# Patient Record
Sex: MALE | Race: AMERICAN INDIAN OR ALASKA NATIVE
[De-identification: names, ages, dates, MRNs, and addresses within clinical notes are randomized per-mention and may not be internally consistent; named-entity substitution may affect disease eponyms.]

---

## 2017-10-19 ENCOUNTER — HOSPITAL ENCOUNTER (OUTPATIENT)
Dept: HOSPITAL 5 - GIO | Age: 62
Discharge: HOME | End: 2017-10-19
Attending: INTERNAL MEDICINE
Payer: COMMERCIAL

## 2017-10-19 VITALS — SYSTOLIC BLOOD PRESSURE: 113 MMHG | DIASTOLIC BLOOD PRESSURE: 68 MMHG

## 2017-10-19 DIAGNOSIS — K64.9: ICD-10-CM

## 2017-10-19 DIAGNOSIS — Z87.891: ICD-10-CM

## 2017-10-19 DIAGNOSIS — K64.8: ICD-10-CM

## 2017-10-19 DIAGNOSIS — K57.30: ICD-10-CM

## 2017-10-19 DIAGNOSIS — D12.3: Primary | ICD-10-CM

## 2017-10-19 DIAGNOSIS — Z91.013: ICD-10-CM

## 2017-10-19 PROCEDURE — 88305 TISSUE EXAM BY PATHOLOGIST: CPT

## 2017-10-19 PROCEDURE — 45384 COLONOSCOPY W/LESION REMOVAL: CPT

## 2017-10-19 NOTE — DISCHARGE SUMMARY
Short Stay Discharge Plan


Activity: advance as tolerated


Weight Bearing Status: Weight Bear as Tolerated


Diet: regular


Follow up with: 


MICHELET RIBEIRO MD [Primary Care Provider] - 7 Days

## 2017-10-19 NOTE — OPERATIVE REPORT
Operative Report


Operative Report: 


Date of procedure: 10/19/2017





Procedure: Colonoscopy with multiple hot biopsy polypectomies.





Attending physician: Alejandro Knox MD





: Alejandro Knox MD





Indication: Patient is a 62-year-old male who presents for colorectal cancer 

screening.  Colonoscopy is done to evaluate patient so that treatment may be 

directed based on the findings.





Consent: Informed consent was obtained after advising the patient and family 

regarding nature of this procedure, its indications, potential benefits as well 

as possible complications including but not limited to bleeding perforation and 

adverse reaction to medication, infection as well as other cardiopulmonary 

complications.  An informed written and verbal consent was then obtained after 

due opportunity was provided for questions and answers.





Monitoring: Patient was monitored continuously with pulse oximetry and 

electrocardiographic recordings as well as blood pressure recordings.  Vital 

signs remained stable throughout this procedure with no untoward events.





Preoperative assessment: Patient was assessed immediately prior to this 

procedure for capacity to tolerate monitored anesthesia care and moderate 

sedation as well as general anesthesia.  Patient's ASA classification is 2, 

Mallampati class is 2, Hyomental distance is 3.





Instrument: WearYouWant video colonoscope.





Medications: Propofol, given intravenously in divided doses.  For details 

please refer to anesthesia records.





Description of procedure: Patient was placed in the left lateral decubitus 

position after achieving sedation, a digital rectal examination was performed 

following which the colonoscope was introduced into the anal verge and advanced 

to the cecum which was identified by the cecal valve, the appendiceal orifice, 

as well as by the cecal strap and direct transillumination.  The colonoscope 

was subsequently withdrawn with careful inspection of all mucosal surfaces.  

Patient tolerated this procedure well and was subsequently taken to the 

recovery room.  The following findings were noted.





Findings: Patient had 2 transverse colon polyps one was 4 mm and another one 

was 8 mm.  Both polyps were sessile.  They were both removed by hot biopsy 

polypectomy and retrieved.  Patient had a few scattered diverticula in the 

sigmoid colon.  The rest of the colon was normal.  On retroflex view at the 

anal verge, patient had internal hemorrhoids.





Impression: Multiple transverse colon polyps status post hot biopsy polypectomy 

and some mild diverticulosis


Internal hemorrhoids.





Plan: Follow pathology report


Repeat colonoscopy in 5 years if polyps are adenomatous.


High-fiber diet.

## 2017-10-19 NOTE — ANESTHESIA CONSULTATION
Anesthesia Consult and Med Hx


Date of service: 10/19/17





- Airway


Anesthetic Teeth Evaluation: Good


ROM Head & Neck: Adequate


Mental/Hyoid Distance: Adequate


Mallampati Class: Class II


Intubation Access Assessment: Probably Good





- Pulmonary Exam


CTA: Yes





- Cardiac Exam


Cardiac Exam: RRR





- Pre-Operative Health Status


ASA Pre-Surgery Classification: ASA1


Proposed Anesthetic Plan: MAC





- Pulmonary


Hx Smoking: Yes (QUIT OVER 30 YRS)





- Cardiovascular System


Hx Hypertension: No





- Central Nervous System


Hx Neuromuscular Disorder: No


Hx Psychiatric Problems: No





- Gastrointestinal


Hx Gastroesophageal Reflux Disease: No





- Endocrine


Hx Renal Disease: No


Hx Insulin Dependent Diabetes: No





- Other Systems


Hx Alcohol Use: No


Hx Substance Use: No


Hx Cancer: No


Hx Obesity: No

## 2020-01-23 ENCOUNTER — HOSPITAL ENCOUNTER (EMERGENCY)
Dept: HOSPITAL 5 - ED | Age: 65
Discharge: HOME | End: 2020-01-23
Payer: COMMERCIAL

## 2020-01-23 VITALS — DIASTOLIC BLOOD PRESSURE: 86 MMHG | SYSTOLIC BLOOD PRESSURE: 135 MMHG

## 2020-01-23 DIAGNOSIS — M19.90: ICD-10-CM

## 2020-01-23 DIAGNOSIS — Z79.899: ICD-10-CM

## 2020-01-23 DIAGNOSIS — Z91.013: ICD-10-CM

## 2020-01-23 DIAGNOSIS — K64.9: Primary | ICD-10-CM

## 2020-01-23 LAB
ALBUMIN SERPL-MCNC: 4.3 G/DL (ref 3.9–5)
ALT SERPL-CCNC: 31 UNITS/L (ref 7–56)
BUN SERPL-MCNC: 12 MG/DL (ref 9–20)
BUN/CREAT SERPL: 15 %
CALCIUM SERPL-MCNC: 9.7 MG/DL (ref 8.4–10.2)
HCT VFR BLD CALC: 43.8 % (ref 35.5–45.6)
HEMOLYSIS INDEX: 4
HGB BLD-MCNC: 14.9 GM/DL (ref 11.8–15.2)
MCHC RBC AUTO-ENTMCNC: 34 % (ref 32–34)
MCV RBC AUTO: 84 FL (ref 84–94)
PLATELET # BLD: 238 K/MM3 (ref 140–440)
RBC # BLD AUTO: 5.24 M/MM3 (ref 3.65–5.03)

## 2020-01-23 PROCEDURE — 36415 COLL VENOUS BLD VENIPUNCTURE: CPT

## 2020-01-23 PROCEDURE — 80053 COMPREHEN METABOLIC PANEL: CPT

## 2020-01-23 PROCEDURE — 85027 COMPLETE CBC AUTOMATED: CPT

## 2020-01-23 PROCEDURE — 74022 RADEX COMPL AQT ABD SERIES: CPT

## 2020-01-23 NOTE — EVENT NOTE
ED Screening Note


ED Screening Note: 





63 yo male with rectal pain- states he is having problems having bm due to it





no hx of same





denies bleeding





This initial assessment/diagnostic orders/clinical plan/treatment(s) is/are 

subject to change based on patients health status, clinical progression and re-

assessment by fellow clinical providers in the ED. Further treatment and workup 

at subsequent clinical providers discretion. Patient/guardian urged not to elope

from the ED as their condition may be serious if not clinically assessed and man

aged. 





Initial orders include: 


needs exam

## 2020-01-23 NOTE — XRAY REPORT
PROCEDURE: ABDOMEN FLAT AND UPRIGHT WITH SINGLE VIEW CHEST



HISTORY: abd pain



COMPARISON: None. 



TECHNIQUE: Supine and upright abdominal as well as single view chest radiographs obtained.



FINDINGS:  

Lungs: Mild streaky left basal lung opacities. Lungs are otherwise clear.

Pleural Effusion: No evidence of a pleural effusion is seen.

Pneumothorax:  No evidence of pneumothorax is seen.

Cardiac: The heart size is normal.  

Mediastinal silhouette: The mediastinal silhouette is normal.

Hilar regions: The hilar regions are normal in appearance.

Pulmonary vascularity: The pulmonary vascularity is normal in appearance.

Trachea: The trachea is midline.

Skeletal structures: The skeletal structures are normal in appearance.



Support hardware: None.



Additional findings: Surgical clips in the left chest wall.



Bowel: The bowel gas pattern is normal in appearance. No evidence of obstruction is seen. No evidence
 of free air is identified.

Calcifications: No abnormal calcifications over the kidneys or along the course of the ureters are se
en.  No phleboliths in the pelvis are seen. Prostatic calcifications.

Osseous Structures: The skeletal structures are unremarkable in appearance.    



Additional findings: None.



IMPRESSION:  

1. Left lower lobe subsegmental atelectasis versus scar.

2. Negative abdomen.



Signer Name: Robinson Vance MD 

Signed: 1/23/2020 3:51 PM

 Workstation Name: [id number]

## 2020-01-23 NOTE — EMERGENCY DEPARTMENT REPORT
ED General Adult HPI





- General


Chief complaint: Rectal Pain


Stated complaint: hemrriods


Time Seen by Provider: 01/23/20 13:47


Source: patient


Mode of arrival: Ambulatory


Limitations: No Limitations





- History of Present Illness


Initial comments: 





This is a 64-year-old male nontoxic, well nourished in appearance, no acute 

signs of distress presents to the ED with c/o of rectal pain.  Patient stated 

that rectal pain causes him to unable to have a bowel movement due to the pain. 

Patient denies any rectal bleeding.  Patient denies any abdominal pain, chest 

pain, shortness of breath, headache, stiff neck, numbness, tingling, flank pain 

or urinary symptoms.  Patient denies any allergies.


MD Complaint: hemorrhoids


-: days(s)


Radiation: non-radiation


Severity scale (0 -10): 3


Quality: aching


Consistency: constant


Improves with: none


Worsens with: none


Associated Symptoms: denies other symptoms.  denies: confusion, chest pain, 

cough, diaphoresis, fever/chills, headaches, malaise, nausea/vomiting, rash, 

seizure, shortness of breath, syncope, weakness


Treatments Prior to Arrival: none





- Related Data


                                Home Medications











 Medication  Instructions  Recorded  Confirmed  Last Taken


 


Tramadol HCl [traMADol  MG] 100 mg PO PRN PRN 10/18/17 10/18/17 Unknown








                                  Previous Rx's











 Medication  Instructions  Recorded  Last Taken  Type


 


Cyclobenzaprine [Flexeril] 10 mg PO Q8H PRN #15 tablet 01/06/16 Unknown Rx


 


HYDROcodone/APAP 5-325 [Kirwin 1 each PO Q6HR PRN #16 tablet 01/06/16 Unknown Rx





5/325]    


 


PE/Mo/Pet,Wh [Preparation H] 28 gm [place] DAILY #1 tube 01/23/20 Unknown Rx











                                    Allergies











Allergy/AdvReac Type Severity Reaction Status Date / Time


 


shellfish derived Allergy  Unknown Verified 10/19/17 09:51














ED Review of Systems


ROS: 


Stated complaint: hemrriods


Other details as noted in HPI





Constitutional: denies: chills, fever


Eyes: denies: eye pain, eye discharge, vision change


ENT: denies: ear pain, throat pain


Respiratory: denies: cough, shortness of breath, wheezing


Cardiovascular: denies: chest pain, palpitations


Endocrine: no symptoms reported


Gastrointestinal: denies: abdominal pain, nausea, diarrhea


Genitourinary: denies: urgency, dysuria


Musculoskeletal: denies: back pain, joint swelling, arthralgia


Skin: denies: rash, lesions


Neurological: denies: headache, weakness, paresthesias


Psychiatric: denies: anxiety, depression


Hematological/Lymphatic: denies: easy bleeding, easy bruising





ED Past Medical Hx





- Past Medical History


Previous Medical History?: Yes


Hx Hypertension: No


Hx Renal Disease: No


Hx Arthritis: Yes (GENERALIZED)


Hx HIV: No





- Surgical History


Past Surgical History?: No





- Social History


Smoking Status: Never Smoker


Substance Use Type: None





- Medications


Home Medications: 


                                Home Medications











 Medication  Instructions  Recorded  Confirmed  Last Taken  Type


 


Cyclobenzaprine [Flexeril] 10 mg PO Q8H PRN #15 tablet 01/06/16  Unknown Rx


 


HYDROcodone/APAP 5-325 [Kirwin 1 each PO Q6HR PRN #16 tablet 01/06/16  Unknown Rx





5/325]     


 


Tramadol HCl [traMADol  MG] 100 mg PO PRN PRN 10/18/17 10/18/17 Unknown 

History


 


PE/Mo/Pet,Wh [Preparation H] 28 gm [place] DAILY #1 tube 01/23/20  Unknown Rx














ED Physical Exam





- General


Limitations: No Limitations


General appearance: alert, in no apparent distress





- Head


Head exam: Present: atraumatic, normocephalic





- Neck


Neck exam: Present: normal inspection, full ROM.  Absent: tenderness, 

meningismus, lymphadenopathy





- Respiratory


Respiratory exam: Present: normal lung sounds bilaterally.  Absent: respiratory 

distress, wheezes, rales, rhonchi, stridor, chest wall tenderness, accessory 

muscle use, decreased breath sounds, prolonged expiratory





- Cardiovascular


Cardiovascular Exam: Present: regular rate, normal rhythm, normal heart sounds. 

Absent: irregular rhythm, systolic murmur, diastolic murmur, rubs, gallop





- GI/Abdominal


GI/Abdominal exam: Present: soft, normal bowel sounds.  Absent: distended, 

tenderness, guarding, rebound, rigid, diminished bowel sounds





- Rectal


Rectal exam: Present: normal inspection, normal rectal tone, hemorrhoids 

(external with ability to reduce.  No bleeding.).  Absent: decreased rectal to

ne, heme (-) stool, heme (+) stool, black stool, bloody stool, fecal impaction, 

mass, tenderness





- Extremities Exam


Extremities exam: Present: normal inspection, full ROM





- Back Exam


Back exam: Present: normal inspection, full ROM.  Absent: tenderness, CVA 

tenderness (R), CVA tenderness (L), muscle spasm, paraspinal tenderness, 

vertebral tenderness, rash noted





- Neurological Exam


Neurological exam: Present: alert, oriented X3, normal gait





- Psychiatric


Psychiatric exam: Present: normal affect, normal mood





- Skin


Skin exam: Present: warm, dry, intact, normal color.  Absent: rash





ED Course





                                   Vital Signs











  01/23/20 01/23/20





  13:47 14:59


 


Temperature 98.2 F 


 


Pulse Rate 98 H 


 


Respiratory 16 17





Rate  


 


Blood Pressure 115/82 


 


O2 Sat by Pulse 99 





Oximetry  














- Reevaluation(s)


Reevaluation #1: 





01/23/20 16:03


Patient is speaking in full sentences with no signs of distress noted.





ED Medical Decision Making





- Lab Data


Result diagrams: 


                                 01/23/20 14:00





                                 01/23/20 14:00





- Medical Decision Making





64-year-old male that presents with external hemorrhoids.  Patient is stable and

was examined by me.  Labs are unremarkable.  X-ray of abdomen is within normal 

limits.  Patient was educated on sitz bath.  Patient be discharged with 

Preparation H.  Patient was instructed to Follow-up with a primary care doctor 

in 3-5 days or if symptoms worsen and continue return to emergency room as soon 

as possible.  At time of discharge, the patient does not seem toxic or ill in 

appearance.  No acute signs of distress noted.  Patient agrees to discharge 

treatment plan of care.  No further questions noted by the patient.


Critical care attestation.: 


If time is entered above; I have spent that time in minutes in the direct care 

of this critically ill patient, excluding procedure time.








ED Disposition


Clinical Impression: 


Hemorrhoids


Qualifiers:


 Hemorrhoid type: unspecified Qualified Code(s): K64.9 - Unspecified hemorrhoids





Disposition: DC-01 TO HOME OR SELFCARE


Is pt being admited?: No


Does the pt Need Aspirin: No


Condition: Stable


Instructions:  Hemorrhoids (ED), Sitz Bath (GEN)


Additional Instructions: 


Follow-up with a primary care doctor in 3-5 days or if symptoms worsen and 

continue return to emergency room as soon as possible. 


Prescriptions: 


PE/Mo/Pet,Wh [Preparation H] 28 gm [place] DAILY #1 tube


Referrals: 


[facility] [Other] - 3-5 Days


PRIMARY CARE,MD [Referring] - 3-5 Days


CARBUCCIA,JENNIFER, MD [Staff Physician] - 3-5 Days


[facility] [Outside] - 3-5 Days


Forms:  Work/School Release Form(ED)